# Patient Record
Sex: FEMALE | Race: WHITE | HISPANIC OR LATINO | Employment: FULL TIME | URBAN - METROPOLITAN AREA
[De-identification: names, ages, dates, MRNs, and addresses within clinical notes are randomized per-mention and may not be internally consistent; named-entity substitution may affect disease eponyms.]

---

## 2019-06-16 ENCOUNTER — HOSPITAL ENCOUNTER (EMERGENCY)
Facility: MEDICAL CENTER | Age: 29
End: 2019-06-17
Attending: EMERGENCY MEDICINE

## 2019-06-16 DIAGNOSIS — R51.9 NONINTRACTABLE EPISODIC HEADACHE, UNSPECIFIED HEADACHE TYPE: ICD-10-CM

## 2019-06-16 LAB
ALBUMIN SERPL BCP-MCNC: 4 G/DL (ref 3.2–4.9)
ALBUMIN/GLOB SERPL: 1.2 G/DL
ALP SERPL-CCNC: 67 U/L (ref 30–99)
ALT SERPL-CCNC: 32 U/L (ref 2–50)
ANION GAP SERPL CALC-SCNC: 9 MMOL/L (ref 0–11.9)
AST SERPL-CCNC: 34 U/L (ref 12–45)
BASOPHILS # BLD AUTO: 0.6 % (ref 0–1.8)
BASOPHILS # BLD: 0.05 K/UL (ref 0–0.12)
BILIRUB SERPL-MCNC: 0.5 MG/DL (ref 0.1–1.5)
BUN SERPL-MCNC: 15 MG/DL (ref 8–22)
CALCIUM SERPL-MCNC: 8.7 MG/DL (ref 8.4–10.2)
CHLORIDE SERPL-SCNC: 108 MMOL/L (ref 96–112)
CO2 SERPL-SCNC: 22 MMOL/L (ref 20–33)
CREAT SERPL-MCNC: 0.89 MG/DL (ref 0.5–1.4)
EOSINOPHIL # BLD AUTO: 0.15 K/UL (ref 0–0.51)
EOSINOPHIL NFR BLD: 1.7 % (ref 0–6.9)
ERYTHROCYTE [DISTWIDTH] IN BLOOD BY AUTOMATED COUNT: 41.6 FL (ref 35.9–50)
GLOBULIN SER CALC-MCNC: 3.3 G/DL (ref 1.9–3.5)
GLUCOSE SERPL-MCNC: 99 MG/DL (ref 65–99)
HCG SERPL QL: NEGATIVE
HCT VFR BLD AUTO: 41.2 % (ref 37–47)
HGB BLD-MCNC: 13.3 G/DL (ref 12–16)
IMM GRANULOCYTES # BLD AUTO: 0.02 K/UL (ref 0–0.11)
IMM GRANULOCYTES NFR BLD AUTO: 0.2 % (ref 0–0.9)
LYMPHOCYTES # BLD AUTO: 3.75 K/UL (ref 1–4.8)
LYMPHOCYTES NFR BLD: 41.3 % (ref 22–41)
MCH RBC QN AUTO: 27.7 PG (ref 27–33)
MCHC RBC AUTO-ENTMCNC: 32.3 G/DL (ref 33.6–35)
MCV RBC AUTO: 85.7 FL (ref 81.4–97.8)
MONOCYTES # BLD AUTO: 0.56 K/UL (ref 0–0.85)
MONOCYTES NFR BLD AUTO: 6.2 % (ref 0–13.4)
NEUTROPHILS # BLD AUTO: 4.55 K/UL (ref 2–7.15)
NEUTROPHILS NFR BLD: 50 % (ref 44–72)
NRBC # BLD AUTO: 0 K/UL
NRBC BLD-RTO: 0 /100 WBC
PLATELET # BLD AUTO: 299 K/UL (ref 164–446)
PMV BLD AUTO: 8.9 FL (ref 9–12.9)
POTASSIUM SERPL-SCNC: 3.5 MMOL/L (ref 3.6–5.5)
PROT SERPL-MCNC: 7.3 G/DL (ref 6–8.2)
RBC # BLD AUTO: 4.81 M/UL (ref 4.2–5.4)
SODIUM SERPL-SCNC: 139 MMOL/L (ref 135–145)
WBC # BLD AUTO: 9.1 K/UL (ref 4.8–10.8)

## 2019-06-16 PROCEDURE — 99284 EMERGENCY DEPT VISIT MOD MDM: CPT

## 2019-06-16 PROCEDURE — 700111 HCHG RX REV CODE 636 W/ 250 OVERRIDE (IP): Performed by: EMERGENCY MEDICINE

## 2019-06-16 PROCEDURE — A9270 NON-COVERED ITEM OR SERVICE: HCPCS | Performed by: EMERGENCY MEDICINE

## 2019-06-16 PROCEDURE — 700111 HCHG RX REV CODE 636 W/ 250 OVERRIDE (IP)

## 2019-06-16 PROCEDURE — 700105 HCHG RX REV CODE 258: Performed by: EMERGENCY MEDICINE

## 2019-06-16 PROCEDURE — 85025 COMPLETE CBC W/AUTO DIFF WBC: CPT

## 2019-06-16 PROCEDURE — 84703 CHORIONIC GONADOTROPIN ASSAY: CPT

## 2019-06-16 PROCEDURE — 96375 TX/PRO/DX INJ NEW DRUG ADDON: CPT

## 2019-06-16 PROCEDURE — 700102 HCHG RX REV CODE 250 W/ 637 OVERRIDE(OP): Performed by: EMERGENCY MEDICINE

## 2019-06-16 PROCEDURE — 80053 COMPREHEN METABOLIC PANEL: CPT

## 2019-06-16 PROCEDURE — 96365 THER/PROPH/DIAG IV INF INIT: CPT

## 2019-06-16 RX ORDER — SODIUM CHLORIDE 9 MG/ML
1000 INJECTION, SOLUTION INTRAVENOUS ONCE
Status: COMPLETED | OUTPATIENT
Start: 2019-06-16 | End: 2019-06-17

## 2019-06-16 RX ORDER — DEXAMETHASONE SODIUM PHOSPHATE 10 MG/ML
10 INJECTION, SOLUTION INTRAMUSCULAR; INTRAVENOUS ONCE
Status: COMPLETED | OUTPATIENT
Start: 2019-06-16 | End: 2019-06-16

## 2019-06-16 RX ORDER — MAGNESIUM SULFATE HEPTAHYDRATE 40 MG/ML
INJECTION, SOLUTION INTRAVENOUS
Status: COMPLETED
Start: 2019-06-16 | End: 2019-06-17

## 2019-06-16 RX ORDER — KETOROLAC TROMETHAMINE 30 MG/ML
15 INJECTION, SOLUTION INTRAMUSCULAR; INTRAVENOUS ONCE
Status: COMPLETED | OUTPATIENT
Start: 2019-06-16 | End: 2019-06-16

## 2019-06-16 RX ORDER — ACETAMINOPHEN 500 MG
1000 TABLET ORAL ONCE
Status: COMPLETED | OUTPATIENT
Start: 2019-06-16 | End: 2019-06-16

## 2019-06-16 RX ADMIN — MAGNESIUM SULFATE HEPTAHYDRATE 2 G: 40 INJECTION, SOLUTION INTRAVENOUS at 23:32

## 2019-06-16 RX ADMIN — SODIUM CHLORIDE 1000 ML: 9 INJECTION, SOLUTION INTRAVENOUS at 23:10

## 2019-06-16 RX ADMIN — KETOROLAC TROMETHAMINE 15 MG: 30 INJECTION, SOLUTION INTRAMUSCULAR; INTRAVENOUS at 23:13

## 2019-06-16 RX ADMIN — DEXAMETHASONE SODIUM PHOSPHATE 10 MG: 10 INJECTION INTRAMUSCULAR; INTRAVENOUS at 23:21

## 2019-06-16 RX ADMIN — PROCHLORPERAZINE EDISYLATE 10 MG: 5 INJECTION INTRAMUSCULAR; INTRAVENOUS at 23:15

## 2019-06-16 RX ADMIN — ACETAMINOPHEN 1000 MG: 500 TABLET, FILM COATED ORAL at 23:12

## 2019-06-17 ENCOUNTER — APPOINTMENT (OUTPATIENT)
Dept: RADIOLOGY | Facility: MEDICAL CENTER | Age: 29
End: 2019-06-17
Attending: EMERGENCY MEDICINE

## 2019-06-17 VITALS
OXYGEN SATURATION: 97 % | BODY MASS INDEX: 38.75 KG/M2 | SYSTOLIC BLOOD PRESSURE: 155 MMHG | HEIGHT: 63 IN | RESPIRATION RATE: 22 BRPM | DIASTOLIC BLOOD PRESSURE: 104 MMHG | HEART RATE: 89 BPM | TEMPERATURE: 99.5 F | WEIGHT: 218.7 LBS

## 2019-06-17 PROCEDURE — 700111 HCHG RX REV CODE 636 W/ 250 OVERRIDE (IP): Performed by: EMERGENCY MEDICINE

## 2019-06-17 PROCEDURE — 700117 HCHG RX CONTRAST REV CODE 255: Performed by: EMERGENCY MEDICINE

## 2019-06-17 PROCEDURE — 96375 TX/PRO/DX INJ NEW DRUG ADDON: CPT

## 2019-06-17 PROCEDURE — 70496 CT ANGIOGRAPHY HEAD: CPT

## 2019-06-17 RX ORDER — HALOPERIDOL 5 MG/ML
2.5 INJECTION INTRAMUSCULAR ONCE
Status: COMPLETED | OUTPATIENT
Start: 2019-06-17 | End: 2019-06-17

## 2019-06-17 RX ORDER — DIPHENHYDRAMINE HYDROCHLORIDE 50 MG/ML
50 INJECTION INTRAMUSCULAR; INTRAVENOUS ONCE
Status: COMPLETED | OUTPATIENT
Start: 2019-06-17 | End: 2019-06-17

## 2019-06-17 RX ADMIN — IOHEXOL 100 ML: 350 INJECTION, SOLUTION INTRAVENOUS at 01:06

## 2019-06-17 RX ADMIN — HALOPERIDOL LACTATE 2.5 MG: 5 INJECTION INTRAMUSCULAR at 00:08

## 2019-06-17 RX ADMIN — DIPHENHYDRAMINE HYDROCHLORIDE 50 MG: 50 INJECTION INTRAMUSCULAR; INTRAVENOUS at 00:08

## 2019-06-17 NOTE — ED NOTES
Rounded on pt. Pt resting quietly on gurney and states she feels a little better.  IVF still infusing.  Call light at bedside- no new needs at this time.

## 2019-06-17 NOTE — ED NOTES
Pt given discharge instructions; verbalized understanding of instructions. Pt advised to follow up with [facility] or [facility] to establish a PCP  Ambulated out with spouse.

## 2019-06-17 NOTE — ED PROVIDER NOTES
ED Provider Note    CHIEF COMPLAINT  Chief Complaint   Patient presents with   • Headache     off and on for 2 weeks, no HX of migraines. States pain is unbearable       HPI  Patient is a 29-year-old female with a history of a and a family history of migraines who presents emergency room headache.  States that approximately 2 weeks ago she developed a low-grade left-sided frontal headache.  During the intervening time she has been having waxing and waning headaches with some intermittent sharp tenderness and head pressure.  She occasionally reports visual changes with described auras only with severe exacerbations.  She denies any fevers, chills or radiation of these sensations into the neck or back.  She reports recent sick contacts with a upper respiratory symptoms the last 2 weeks as well.  She denies any shortness of breath, chest pain, abdominal pain or nausea vomiting.      REVIEW OF SYSTEMS  Constitutional: No fevers, chills, + recent illness.  Skin: No rashes or diaphoresis.  Eyes: vision changes as above, no discharge.  ENT: No hearing change. No rhinorrhea or nasal congestion, no ST or difficulty swallowing.  Respiratory: No SOB. No coughing or hemoptysis. No Wheezing.  Cardiac: No CP, palpitations, edema. No PND or orthopnea.  GI: No Abdominal Pain; N/V; diarrhea, constipation. No blood in stool.  : No dysuria. No D/C. No frequency or urgency. No hesitancy. LMP=mirena IUD  MSK: No pain in joints or muscles. No calf pain or swelling.  Neuro: No paresthesias. No focal weakness.  Psych: No SI, HI, AH, VH.  Endocrine: No polyuria or polydipsia. No heat or cold intolerance.  Heme: No easy bruising. No history of bleeding disorders or anemia.    PAST MEDICAL HISTORY   has a past medical history of Psychiatric disorder; Substance abuse ([facility]) (2010); and Urinary tract infection, site not specified.    SOCIAL HISTORY  Social History     Social History Main Topics   • Smoking status: Current Some Day Smoker      "Types: Cigarettes   • Smokeless tobacco: Never Used   • Alcohol use Yes      Comment: socially   • Drug use: Yes     Types: Marijuana      Comment: ECXTASY, MJ, COCAINE, MULTIPLE... last used marijuana 6/3/12   • Sexual activity: Yes     Partners: Male      Comment: none       SURGICAL HISTORY   has a past surgical history that includes primary c section (2/12/2013).    CURRENT MEDICATIONS  Home Medications    **Home medications have not yet been reviewed for this encounter**         ALLERGIES  Allergies   Allergen Reactions   • Nkda [No Known Drug Allergy]        PHYSICAL EXAM  VITAL SIGNS: /104   Pulse (!) 108   Temp 37.5 °C (99.5 °F) (Temporal)   Resp (!) 22   Ht 1.6 m (5' 3\")   Wt 99.2 kg (218 lb 11.1 oz)   SpO2 95%   BMI 38.74 kg/m²     Pulse ox interpretation: I interpret this pulse ox as normal.  Genl: F sitting in chair comfortably, speaking clearly, appears extremely anxious but in no acute distress   Head: NC/AT   ENT: Mucous membranes dry, posterior pharynx clear, uvula midline, nares patent bilaterally   Eyes: Normal sclera, pupils equal round reactive to light  Neck: Supple, FROM, no LAD appreciated.  No carotid bruit  Pulmonary: Lungs are clear to auscultation bilaterally  Chest: No TTP  CV:  tachycardia, no murmur appreciated, pulses 2+ in both upper and lower extremities,  Abdomen: soft, NT/ND; no rebound/guarding, no masses palpated, no HSM   : no CVA or suprapubic tenderness   Musculoskeletal: Pain free ROM of the neck. Moving upper and lower extremities and spontaneous in coordinated fashion  Neuro: Mental Status: Speech fluent without errors. Follows all commands. No dysarthria or apraxia.  Cranial Nerves: Pupils equal round and reactive to light. Extraocular motion intact. Visual fields intact. No nystagmus. CN V1-V3 intact to light touch. No facial asymmetry. Hearing clinically intact bilaterally. Tongue protrusion midline. No uvular deviation. Normal shoulder shrug and head " turn.  Motor:  RUE: 5/5 with hand , 5/5 with flexion at the elbow 5/5 with extension at the elbow  LUE: 5/5 with hand , 5/5 with flexion at the elbow 5/5 with extension at the elbow  RLE: 5/5 with leg raise, 5/5 with plantar flexion, 5/5 with dorsal flexion  LLE: 5/5 with leg raise, 5/5 with plantar flexion, 5/5 with dorsal flexion  Sensation to light touch intact throughout  Reflexes 2+ Patellar tendons, No ataxia noted, Rapidly alternating movements without difficulty  Psych: Patient has an appropriate affect and behavior  Skin: No rash or lesions.  No pallor or jaundice.  No cyanosis.  Warm and dry.     DIAGNOSTIC STUDIES / PROCEDURES    LABS  Labs Reviewed   CBC WITH DIFFERENTIAL - Abnormal; Notable for the following:        Result Value    MCHC 32.3 (*)     MPV 8.9 (*)     Lymphocytes 41.30 (*)     All other components within normal limits   COMP METABOLIC PANEL - Abnormal; Notable for the following:     Potassium 3.5 (*)     All other components within normal limits   HCG QUAL SERUM   ESTIMATED GFR     RADIOLOGY  CT-CTA HEAD WITH & W/O-POST PROCESS    (Results Pending)     COURSE & MEDICAL DECISION MAKING  Pertinent Labs & Imaging studies reviewed. (See chart for details)    DDX:  Migraine/tension/cluster headaches  Traumatic: EDH/SDH/SAH - less likely  Infectious: meningitis/encephalitis - less likely  Other: Temporal arteritis, glaucoma,Intracranial mass/Neoplasm,Idiopathic Intracranial Hypertension  HTN emergency  PRES syndrome  Carbon Monoxide Poisoning  Dural sinus thrombosis  Vertebral or carotid artery dissection    MDM    Initial evaluation at 1030pm:    Patient presents emergency room for the symptoms as described above.  She is very anxious appearing on initial evaluation though she has no signs of acute neurological dysfunction, no signs of increased intracranial pressure on my exam.  Her current headache symptomology is consistent with likely migraine.  She has a strong familial history of  migraine though she does not have strong history of this herself.  She has had 2 weeks of waxing and waning symptoms.  During this time she has had no weakness, no thunderclap onset, and had relative worsening of all this while she was experiencing an upper respiratory infection.  At the time of my evaluation she was borderline tachycardic and slightly tachypneic as she was anxious appearing.  Reassessment shortly thereafter after receiving medications demonstrated normalization of her vital signs.  IV access had been established and labs were drawn for the work-up per my differential above.      Of the multiple differentials, I do believe that dehydration, viral syndrome and stress are likely contributing.  I cannot exclude the possibility of an aneurysm.  The patient has no familial history of sudden death or aneurysmal disorders in the family.  She has no known kidney or liver dysfunction.  She has no evolving neurological dysfunction.  Based on these acute concerns we had an extensive discussion regarding the pros and cons of getting a CTA versus a lumbar puncture and the patient does not want a spinal tap for rule out.  CTA of the head is obtained as she is acutely worried about an aneurysm and this demonstrated normal intracranial findings, no abnormal vasculature.  Following medication administration, she ambulates with a steady gait, has no focal neurological exam findings, and is able to tolerate p.o. intake without difficulty.  Her headache has substantially improved at this time and she is amenable to going home with strict return precautions.  We discussed the possibility of sharp sudden return, any reevaluation for weakness, numbness or not acting like herself, and reevaluation with her primary care practitioner in the coming week.  Questions are addressed at the bedside she is discharged home in stable condition.    HYDRATION: Based on the patient's presentation of Dehydration and Tachycardia the  patient was given IV fluids. IV Hydration was used because oral hydration was not adequate alone. Upon recheck following hydration, the patient was improved.    The patient will not drink alcohol nor drive with prescribed medications. The patient will return for worsening symptoms and is stable at the time of discharge. The patient verbalizes understanding and will comply.    FINAL IMPRESSION  Visit Diagnoses     ICD-10-CM   1. Nonintractable episodic headache, unspecified headache type R51          Electronically signed by: Dean Davidson, 6/16/2019 10:25 PM

## 2019-06-17 NOTE — ED TRIAGE NOTES
"Chief Complaint   Patient presents with   • Headache     off and on for 2 weeks, no HX of migraines. States pain is unbearable     /104   Pulse (!) 108   Temp 37.5 °C (99.5 °F) (Temporal)   Resp (!) 22   Ht 1.6 m (5' 3\")   Wt 99.2 kg (218 lb 11.1 oz)   SpO2 95%   BMI 38.74 kg/m²     "

## 2019-06-17 NOTE — ED NOTES
Rounded on pt.  Pt states her headache is gone but she feels really sleepy.  Waiting for CTA results.

## 2023-04-19 ENCOUNTER — HOSPITAL ENCOUNTER (OUTPATIENT)
Dept: RADIOLOGY | Facility: MEDICAL CENTER | Age: 33
End: 2023-04-19
Attending: STUDENT IN AN ORGANIZED HEALTH CARE EDUCATION/TRAINING PROGRAM
Payer: COMMERCIAL

## 2023-04-19 ENCOUNTER — OFFICE VISIT (OUTPATIENT)
Dept: MEDICAL GROUP | Facility: MEDICAL CENTER | Age: 33
End: 2023-04-19
Payer: COMMERCIAL

## 2023-04-19 ENCOUNTER — HOSPITAL ENCOUNTER (OUTPATIENT)
Dept: LAB | Facility: MEDICAL CENTER | Age: 33
End: 2023-04-19
Attending: STUDENT IN AN ORGANIZED HEALTH CARE EDUCATION/TRAINING PROGRAM
Payer: COMMERCIAL

## 2023-04-19 VITALS
HEART RATE: 94 BPM | SYSTOLIC BLOOD PRESSURE: 132 MMHG | TEMPERATURE: 97.9 F | OXYGEN SATURATION: 93 % | WEIGHT: 251.32 LBS | HEIGHT: 63 IN | RESPIRATION RATE: 16 BRPM | BODY MASS INDEX: 44.53 KG/M2 | DIASTOLIC BLOOD PRESSURE: 78 MMHG

## 2023-04-19 DIAGNOSIS — Z00.00 PREVENTATIVE HEALTH CARE: ICD-10-CM

## 2023-04-19 DIAGNOSIS — Z76.89 ENCOUNTER TO ESTABLISH CARE WITH NEW DOCTOR: ICD-10-CM

## 2023-04-19 DIAGNOSIS — R10.12 LEFT UPPER QUADRANT PAIN: ICD-10-CM

## 2023-04-19 DIAGNOSIS — Z11.3 SCREEN FOR STD (SEXUALLY TRANSMITTED DISEASE): ICD-10-CM

## 2023-04-19 DIAGNOSIS — Z97.5 IUD (INTRAUTERINE DEVICE) IN PLACE: ICD-10-CM

## 2023-04-19 LAB
ALBUMIN SERPL BCP-MCNC: 4.4 G/DL (ref 3.2–4.9)
ALBUMIN/GLOB SERPL: 1.2 G/DL
ALP SERPL-CCNC: 94 U/L (ref 30–99)
ALT SERPL-CCNC: 26 U/L (ref 2–50)
ANION GAP SERPL CALC-SCNC: 13 MMOL/L (ref 7–16)
APPEARANCE UR: ABNORMAL
AST SERPL-CCNC: 20 U/L (ref 12–45)
BACTERIA #/AREA URNS HPF: ABNORMAL /HPF
BASOPHILS # BLD AUTO: 0.5 % (ref 0–1.8)
BASOPHILS # BLD: 0.07 K/UL (ref 0–0.12)
BILIRUB SERPL-MCNC: 0.6 MG/DL (ref 0.1–1.5)
BILIRUB UR QL STRIP.AUTO: NEGATIVE
BUN SERPL-MCNC: 10 MG/DL (ref 8–22)
CALCIUM ALBUM COR SERPL-MCNC: 8.9 MG/DL (ref 8.5–10.5)
CALCIUM SERPL-MCNC: 9.2 MG/DL (ref 8.4–10.2)
CHLORIDE SERPL-SCNC: 98 MMOL/L (ref 96–112)
CHOLEST SERPL-MCNC: 210 MG/DL (ref 100–199)
CO2 SERPL-SCNC: 24 MMOL/L (ref 20–33)
COLOR UR: YELLOW
CREAT SERPL-MCNC: 0.57 MG/DL (ref 0.5–1.4)
EOSINOPHIL # BLD AUTO: 0.17 K/UL (ref 0–0.51)
EOSINOPHIL NFR BLD: 1.3 % (ref 0–6.9)
EPI CELLS #/AREA URNS HPF: ABNORMAL /HPF
ERYTHROCYTE [DISTWIDTH] IN BLOOD BY AUTOMATED COUNT: 41.9 FL (ref 35.9–50)
EST. AVERAGE GLUCOSE BLD GHB EST-MCNC: 126 MG/DL
GFR SERPLBLD CREATININE-BSD FMLA CKD-EPI: 124 ML/MIN/1.73 M 2
GLOBULIN SER CALC-MCNC: 3.7 G/DL (ref 1.9–3.5)
GLUCOSE SERPL-MCNC: 95 MG/DL (ref 65–99)
GLUCOSE UR STRIP.AUTO-MCNC: NEGATIVE MG/DL
HBA1C MFR BLD: 6 % (ref 4–5.6)
HBV SURFACE AB SERPL IA-ACNC: 4.74 MIU/ML (ref 0–10)
HCT VFR BLD AUTO: 45.1 % (ref 37–47)
HCV AB SER QL: NORMAL
HDLC SERPL-MCNC: 42 MG/DL
HGB BLD-MCNC: 14.8 G/DL (ref 12–16)
HIV 1+2 AB+HIV1 P24 AG SERPL QL IA: NORMAL
IMM GRANULOCYTES # BLD AUTO: 0.05 K/UL (ref 0–0.11)
IMM GRANULOCYTES NFR BLD AUTO: 0.4 % (ref 0–0.9)
KETONES UR STRIP.AUTO-MCNC: ABNORMAL MG/DL
LDLC SERPL CALC-MCNC: 115 MG/DL
LEUKOCYTE ESTERASE UR QL STRIP.AUTO: NEGATIVE
LYMPHOCYTES # BLD AUTO: 2.67 K/UL (ref 1–4.8)
LYMPHOCYTES NFR BLD: 19.9 % (ref 22–41)
MCH RBC QN AUTO: 27.2 PG (ref 27–33)
MCHC RBC AUTO-ENTMCNC: 32.8 G/DL (ref 33.6–35)
MCV RBC AUTO: 82.9 FL (ref 81.4–97.8)
MICRO URNS: ABNORMAL
MONOCYTES # BLD AUTO: 0.81 K/UL (ref 0–0.85)
MONOCYTES NFR BLD AUTO: 6 % (ref 0–13.4)
MUCOUS THREADS #/AREA URNS HPF: ABNORMAL /HPF
NEUTROPHILS # BLD AUTO: 9.65 K/UL (ref 2–7.15)
NEUTROPHILS NFR BLD: 71.9 % (ref 44–72)
NITRITE UR QL STRIP.AUTO: NEGATIVE
NRBC # BLD AUTO: 0 K/UL
NRBC BLD-RTO: 0 /100 WBC
PH UR STRIP.AUTO: 6 [PH] (ref 5–8)
PLATELET # BLD AUTO: 343 K/UL (ref 164–446)
PMV BLD AUTO: 8.6 FL (ref 9–12.9)
POTASSIUM SERPL-SCNC: 4 MMOL/L (ref 3.6–5.5)
PROT SERPL-MCNC: 8.1 G/DL (ref 6–8.2)
PROT UR QL STRIP: NEGATIVE MG/DL
RBC # BLD AUTO: 5.44 M/UL (ref 4.2–5.4)
RBC # URNS HPF: ABNORMAL /HPF
RBC UR QL AUTO: NEGATIVE
SODIUM SERPL-SCNC: 135 MMOL/L (ref 135–145)
SP GR UR STRIP.AUTO: >=1.03
T PALLIDUM AB SER QL IA: NORMAL
TRIGL SERPL-MCNC: 267 MG/DL (ref 0–149)
TSH SERPL DL<=0.005 MIU/L-ACNC: 1.16 UIU/ML (ref 0.38–5.33)
WBC # BLD AUTO: 13.4 K/UL (ref 4.8–10.8)
WBC #/AREA URNS HPF: ABNORMAL /HPF

## 2023-04-19 PROCEDURE — 700117 HCHG RX CONTRAST REV CODE 255: Performed by: STUDENT IN AN ORGANIZED HEALTH CARE EDUCATION/TRAINING PROGRAM

## 2023-04-19 PROCEDURE — 36415 COLL VENOUS BLD VENIPUNCTURE: CPT

## 2023-04-19 PROCEDURE — 81001 URINALYSIS AUTO W/SCOPE: CPT

## 2023-04-19 PROCEDURE — 87389 HIV-1 AG W/HIV-1&-2 AB AG IA: CPT

## 2023-04-19 PROCEDURE — 86706 HEP B SURFACE ANTIBODY: CPT

## 2023-04-19 PROCEDURE — 80053 COMPREHEN METABOLIC PANEL: CPT

## 2023-04-19 PROCEDURE — 74160 CT ABDOMEN W/CONTRAST: CPT

## 2023-04-19 PROCEDURE — 86780 TREPONEMA PALLIDUM: CPT

## 2023-04-19 PROCEDURE — 86803 HEPATITIS C AB TEST: CPT

## 2023-04-19 PROCEDURE — 85025 COMPLETE CBC W/AUTO DIFF WBC: CPT

## 2023-04-19 PROCEDURE — 99203 OFFICE O/P NEW LOW 30 MIN: CPT | Performed by: STUDENT IN AN ORGANIZED HEALTH CARE EDUCATION/TRAINING PROGRAM

## 2023-04-19 PROCEDURE — 84443 ASSAY THYROID STIM HORMONE: CPT

## 2023-04-19 PROCEDURE — 80061 LIPID PANEL: CPT

## 2023-04-19 PROCEDURE — 87591 N.GONORRHOEAE DNA AMP PROB: CPT

## 2023-04-19 PROCEDURE — 83036 HEMOGLOBIN GLYCOSYLATED A1C: CPT

## 2023-04-19 PROCEDURE — 87491 CHLMYD TRACH DNA AMP PROBE: CPT

## 2023-04-19 RX ADMIN — IOHEXOL 100 ML: 350 INJECTION, SOLUTION INTRAVENOUS at 18:30

## 2023-04-19 ASSESSMENT — PATIENT HEALTH QUESTIONNAIRE - PHQ9: CLINICAL INTERPRETATION OF PHQ2 SCORE: 0

## 2023-04-19 NOTE — PROGRESS NOTES
"Subjective:     CC:  Diagnoses of Left upper quadrant pain, BMI 40.0-44.9, adult (HCC), IUD (intrauterine device) in place, Preventative health care, Screen for STD (sexually transmitted disease), and Encounter to establish care with new doctor were pertinent to this visit.    HISTORY OF THE PRESENT ILLNESS: Patient is a 32 y.o. female. This pleasant patient is here today to establish care and discuss chronic conditions. Her prior PCP was none.    Problem   Preventative Health Care    Patient is here to establish care today.     Bmi 40.0-44.9, Adult (Hcc)    Chronic condition. She tries to eat healthy in general. She does not regularly exercise.     IUD (Intrauterine Device) in Place    Chronic condition. She had replacement Mirena IUD put in . Followed by OBGYN Dr. Landis at [facility].     Screen for Std (Sexually Transmitted Disease)    Patient requesting screening for STDs. She is in an open relationship with her . She does not have any active symptoms.     Left Upper Quadrant Pain    Acute condition. Her last bowel movement was today. Denies fever, hematuria, n/v/d, constipation, flank pain, painful urination.    Character: pressure, \"baseball of gas\"  Onset: 3 days  Location: Left flank  Duration: persistent  Exacerbating factors: after eating ramen with hot sauce  Relieving factors: unknown  Associated symptoms: bloating  Severity: 6/10    Patient reports for the last 12 months she has been taking more ibuprofen for headaches, muscle injury last August.    She reports history of kidney stones although it feels different this time.    She did have  about 10 years ago.         No current [facility]-ordered outpatient medications on file.     No current [facility]-ordered facility-administered medications on file.     Social history  Living situation: lives with family at home  Occupation: works as manager at apartment complex  Marital status:   Alcohol/tobacco/illicit drugs: " "vapes daily, occasional EtOH, smokes marijuana daily    OBGYN:  via , no menstrual period now due to IUD    ROS:   See HPI      Objective:     Exam: /78   Pulse 94   Temp 36.6 °C (97.9 °F) (Temporal)   Resp 16   Ht 1.6 m (5' 3\")   Wt 114 kg (251 lb 5.2 oz)   SpO2 93%  Body mass index is 44.52 kg/m².    General: Normal appearing. No distress.  Pulmonary: Clear to ausculation. Normal effort. No rales, ronchi, or wheezing.  Cardiovascular: Regular rate and rhythm without murmur.  Abdomen: Soft, + TTP to left upper quadrant with mild guarding, nondistended. Normal bowel sounds. No CVA tenderness.  Musculoskeletal: Normal gait. No extremity cyanosis, clubbing, or edema.  Psych: Normal mood and affect. Alert and oriented x3. Judgment and insight is normal.    Labs:   Lab Results   Component Value Date/Time    WBC 9.1 2019 10:53 PM    RBC 4.81 2019 10:53 PM    HEMOGLOBIN 13.3 2019 10:53 PM    HEMATOCRIT 41.2 2019 10:53 PM    MCV 85.7 2019 10:53 PM    MCH 27.7 2019 10:53 PM    MCHC 32.3 (L) 2019 10:53 PM    RDW 41.6 2019 10:53 PM    PLATELETCT 299 2019 10:53 PM    MPV 8.9 (L) 2019 10:53 PM      Lab Results   Component Value Date/Time    SODIUM 139 2019 10:53 PM    POTASSIUM 3.5 (L) 2019 10:53 PM    CHLORIDE 108 2019 10:53 PM    CO2 22 2019 10:53 PM    ANION 9.0 2019 10:53 PM    GLUCOSE 99 2019 10:53 PM    BUN 15 2019 10:53 PM    CREATININE 0.89 2019 10:53 PM    CALCIUM 8.7 2019 10:53 PM    ASTSGOT 34 2019 10:53 PM    ALTSGPT 32 2019 10:53 PM    TBILIRUBIN 0.5 2019 10:53 PM    ALBUMIN 4.0 2019 10:53 PM    TOTPROTEIN 7.3 2019 10:53 PM    GLOBULIN 3.3 2019 10:53 PM    AGRATIO 1.2 2019 10:53 PM       Assessment & Plan:   32 y.o. female with the following -    1. Left upper quadrant pain  Acute condition, persistent. No evidence of acute abdomen but " patient appears quite uncomfortable. Suspect possible nephrolithiasis vs GI related issues. She also had  in the past which can increase risk for adhesions/bowel obstruction. Follow up testing per orders, will call patient with results. ED precautions discussed.  - URINALYSIS,CULTURE IF INDICATED; Future  - CT-ABDOMEN WITH; Future    2. BMI 40.0-44.9, adult (HCC)  - Comp Metabolic Panel; Future  - HEMOGLOBIN A1C; Future  - Lipid Profile; Future  - TSH WITH REFLEX TO FT4; Future  - Patient identified as having weight management issue.  Appropriate orders and counseling given.    3. IUD (intrauterine device) in place  Chronic condition, stable. Followed by OBGYN Dr. Landis at [facility].  - cont current regimen: Mirena IUD    4. Preventative health care  - CBC WITH DIFFERENTIAL; Future  - Comp Metabolic Panel; Future  - HEMOGLOBIN A1C; Future  - HIV AG/AB COMBO ASSAY SCREENING; Future  - HEP C VIRUS ANTIBODY; Future  - Lipid Profile; Future  - TSH WITH REFLEX TO FT4; Future  - HEP B SURFACE AB; Future    5. Screen for STD (sexually transmitted disease)  - HIV AG/AB COMBO ASSAY SCREENING; Future  - T.PALLIDUM AB GERSON (SCREENING); Future  - Chlamydia/GC, PCR (Urine); Future    6. Encounter to establish care with new doctor        Return in about 4 weeks (around 2023) for lab results.    Please note that this dictation was created using voice recognition software. I have made every reasonable attempt to correct obvious errors, but I expect that there are errors of grammar and possibly content that I did not discover before finalizing the note.

## 2023-04-19 NOTE — LETTER
2023    To Whom It May Concern:         This is confirmation that Sabrina Alicea (: 1990) attended her scheduled appointment with Speedy Kimball D.O. on 23.    Please excuse Sabrina from work from 23-23 due to acute medical condition.         If you have any questions please do not hesitate to call me at the phone number listed below.    Sincerely,          Speedy Kimball D.O.  [phone]

## 2023-04-20 LAB
C TRACH DNA SPEC QL NAA+PROBE: NEGATIVE
N GONORRHOEA DNA SPEC QL NAA+PROBE: NEGATIVE
SPECIMEN SOURCE: NORMAL

## 2023-05-18 ENCOUNTER — APPOINTMENT (OUTPATIENT)
Dept: MEDICAL GROUP | Facility: MEDICAL CENTER | Age: 33
End: 2023-05-18
Payer: COMMERCIAL

## 2023-05-22 PROBLEM — E78.5 DYSLIPIDEMIA: Status: ACTIVE | Noted: 2023-05-22

## 2023-05-22 PROBLEM — K76.0 HEPATIC STEATOSIS: Status: ACTIVE | Noted: 2023-05-22

## 2023-05-22 PROBLEM — K44.9 HIATAL HERNIA: Status: ACTIVE | Noted: 2023-05-22

## 2023-05-22 PROBLEM — R73.03 PREDIABETES: Status: ACTIVE | Noted: 2023-05-22

## 2023-05-22 NOTE — PROGRESS NOTES
Subjective:     CC: lab results follow up    HPI:   Sabrina presents today for lab results follow up    Problem   Hiatal Hernia    Chronic condition. Small hiatal hernia seen on CT abd 2023.       Hepatic Steatosis    Chronic condition. Seen on CT abd 2023.       Dyslipidemia    Chronic condition. Managed with healthy lifestyle management.       Prediabetes    Chronic condition. Managed with healthy lifestyle management. A1C 6.0 (2023).           No current [facility]-ordered outpatient medications on file.     No current [facility]-ordered facility-administered medications on file.     Social history  Living situation: lives with family at home  Occupation: works as manager at apartment complex  Marital status:   Alcohol/tobacco/illicit drugs: vapes daily, occasional EtOH, smokes marijuana daily     OBGYN:  via , no menstrual period now due to IUD     ROS:   See HPI    Objective:     Exam:  There were no vitals taken for this visit. There is no height or weight on file to calculate BMI.    Gen: Alert and oriented, No apparent distress.  Neck: Neck is supple without lymphadenopathy.  Lungs: Normal effort, CTA bilaterally, no wheezes, rhonchi, or rales  CV: Regular rate and rhythm. No murmurs, rubs, or gallops.  Ext: No clubbing, cyanosis, edema.    Labs:   Lab Results   Component Value Date/Time    WBC 13.4 (H) 2023 02:59 PM    RBC 5.44 (H) 2023 02:59 PM    HEMOGLOBIN 14.8 2023 02:59 PM    HEMATOCRIT 45.1 2023 02:59 PM    MCV 82.9 2023 02:59 PM    MCH 27.2 2023 02:59 PM    MCHC 32.8 (L) 2023 02:59 PM    RDW 41.9 2023 02:59 PM    PLATELETCT 343 2023 02:59 PM    MPV 8.6 (L) 2023 02:59 PM      Lab Results   Component Value Date/Time    SODIUM 135 2023 02:59 PM    POTASSIUM 4.0 2023 02:59 PM    CHLORIDE 98 2023 02:59 PM    CO2 24 2023 02:59 PM    ANION 13.0 2023 02:59 PM    GLUCOSE 95 2023 02:59 PM    BUN 10  04/19/2023 02:59 PM    CREATININE 0.57 04/19/2023 02:59 PM    CALCIUM 9.2 04/19/2023 02:59 PM    ASTSGOT 20 04/19/2023 02:59 PM    ALTSGPT 26 04/19/2023 02:59 PM    TBILIRUBIN 0.6 04/19/2023 02:59 PM    ALBUMIN 4.4 04/19/2023 02:59 PM    TOTPROTEIN 8.1 04/19/2023 02:59 PM    GLOBULIN 3.7 (H) 04/19/2023 02:59 PM    AGRATIO 1.2 04/19/2023 02:59 PM     Lab Results   Component Value Date/Time    HBA1C 6.0 (H) 04/19/2023 02:59 PM      Lab Results   Component Value Date/Time    CHOLSTRLTOT 210 (H) 04/19/2023 1459    TRIGLYCERIDE 267 (H) 04/19/2023 1459    HDL 42 04/19/2023 1459     (H) 04/19/2023 1459     Lab Results   Component Value Date/Time    TSHULTRASEN 1.160 04/19/2023 1459     CT abd 04/2023  IMPRESSION:  1.  Acute uncomplicated diverticulitis of the descending colon. No diverticular abscess.  2.  Hepatic steatosis.  3.  Small hiatal hernia.    Assessment & Plan:     32 y.o. female with the following -     1. Dyslipidemia    2. Prediabetes    3. Hepatic steatosis    4. Hiatal hernia      No follow-ups on file.    Please note that this dictation was created using voice recognition software. I have made every reasonable attempt to correct obvious errors, but I expect that there are errors of grammar and possibly content that I did not discover before finalizing the note.

## 2023-05-23 ENCOUNTER — APPOINTMENT (OUTPATIENT)
Dept: MEDICAL GROUP | Facility: MEDICAL CENTER | Age: 33
End: 2023-05-23
Payer: COMMERCIAL

## 2023-09-01 ENCOUNTER — DOCUMENTATION (OUTPATIENT)
Dept: HEALTH INFORMATION MANAGEMENT | Facility: OTHER | Age: 33
End: 2023-09-01

## 2025-01-15 ENCOUNTER — OFFICE VISIT (OUTPATIENT)
Dept: URGENT CARE | Facility: CLINIC | Age: 35
End: 2025-01-15
Payer: COMMERCIAL

## 2025-01-15 VITALS
TEMPERATURE: 98.7 F | OXYGEN SATURATION: 96 % | HEART RATE: 74 BPM | SYSTOLIC BLOOD PRESSURE: 114 MMHG | WEIGHT: 234 LBS | DIASTOLIC BLOOD PRESSURE: 70 MMHG | RESPIRATION RATE: 18 BRPM | BODY MASS INDEX: 39.95 KG/M2 | HEIGHT: 64 IN

## 2025-01-15 DIAGNOSIS — R20.2 PARESTHESIA: ICD-10-CM

## 2025-01-15 DIAGNOSIS — G56.01 CARPAL TUNNEL SYNDROME OF RIGHT WRIST: ICD-10-CM

## 2025-01-15 PROCEDURE — 3078F DIAST BP <80 MM HG: CPT | Performed by: PHYSICIAN ASSISTANT

## 2025-01-15 PROCEDURE — 99213 OFFICE O/P EST LOW 20 MIN: CPT | Performed by: PHYSICIAN ASSISTANT

## 2025-01-15 PROCEDURE — 3074F SYST BP LT 130 MM HG: CPT | Performed by: PHYSICIAN ASSISTANT

## 2025-01-15 RX ORDER — NAPROXEN 500 MG/1
500 TABLET ORAL 2 TIMES DAILY WITH MEALS
Qty: 60 TABLET | Refills: 0 | Status: SHIPPED | OUTPATIENT
Start: 2025-01-15

## 2025-01-15 ASSESSMENT — ENCOUNTER SYMPTOMS
TINGLING: 1
FOCAL WEAKNESS: 0
SENSORY CHANGE: 1

## 2025-01-15 ASSESSMENT — FIBROSIS 4 INDEX: FIB4 SCORE: 0.38

## 2025-01-15 NOTE — PROGRESS NOTES
"Subjective:   Sabrina Alicea  is a 34 y.o. female who presents for Arm Pain (R arm, pain and tingling, x over 6 months )      Arm Pain   The incident occurred more than 1 week ago (over 5-6mos). There was no injury mechanism. Associated symptoms include tingling.   Patient presents urgent care noting right hand numbness and tingling that is been waxing waning but fairly persistent for 5 to 6 months.  She notes tingling sensation to primarily third and fourth digit of right hand.  She describes worsening at night.  She is right-hand dominant.  She denies reproduction of paresthesia with neck range of motion.  At times she notes a small patch on lateral aspect of right shoulder that feels similarly tingly.  She has tried no treatments.  Denies trauma or injury.    Review of Systems   Musculoskeletal:  Negative for joint pain.   Neurological:  Positive for tingling and sensory change. Negative for focal weakness.       Allergies   Allergen Reactions    Nkda [No Known Drug Allergy]         Objective:   /70   Pulse 74   Temp 37.1 °C (98.7 °F)   Resp 18   Ht 1.626 m (5' 4\")   Wt 106 kg (234 lb)   SpO2 96%   BMI 40.17 kg/m²     Physical Exam  Vitals and nursing note reviewed.   Constitutional:       General: She is not in acute distress.     Appearance: She is well-developed. She is not diaphoretic.   HENT:      Head: Normocephalic and atraumatic.      Right Ear: External ear normal.      Left Ear: External ear normal.      Nose: Nose normal.   Eyes:      General: Lids are normal. No scleral icterus.        Right eye: No discharge.         Left eye: No discharge.      Conjunctiva/sclera: Conjunctivae normal.   Neck:      Comments: No reproduction with neck range of motion  Pulmonary:      Effort: Pulmonary effort is normal. No respiratory distress.   Musculoskeletal:         General: Normal range of motion.      Cervical back: Neck supple.      Comments: Bilateral hand and wrist with no effusion erythema or " ecchymosis, full active range of motion, normal sensation light touch, brisk capillary refill, interosseous strength is normal bilaterally, positive Tinel's at wrist and cubital tunnel, positive Phalen's   Skin:     General: Skin is warm and dry.      Coloration: Skin is not pale.      Findings: No erythema.   Neurological:      Mental Status: She is alert and oriented to person, place, and time. She is not disoriented.   Psychiatric:         Speech: Speech normal.         Behavior: Behavior normal.       Patient is fit with wrist brace and tolerates well.  Assessment/Plan:   1. Carpal tunnel syndrome of right wrist  - naproxen (NAPROSYN) 500 MG Tab; Take 1 Tablet by mouth 2 times a day with meals.  Dispense: 60 Tablet; Refill: 0  - Referral to Orthopedics    2. Paresthesia  - naproxen (NAPROSYN) 500 MG Tab; Take 1 Tablet by mouth 2 times a day with meals.  Dispense: 60 Tablet; Refill: 0  - Referral to Orthopedics    Discussed with patient some symptoms very consistent with carpal tunnel syndrome.  May be some contribution of cubital tunnel syndrome.  With small area of reported paresthesia on the lateral shoulder patient is referred for orthopedics.  Return to clinic with lack of resolution or progression of symptoms.      I have worn an N95 mask, gloves and eye protection for the entire encounter with this patient.     Differential diagnosis, natural history, supportive care, and indications for immediate follow-up discussed.